# Patient Record
Sex: MALE | Race: WHITE | Employment: FULL TIME | URBAN - NONMETROPOLITAN AREA
[De-identification: names, ages, dates, MRNs, and addresses within clinical notes are randomized per-mention and may not be internally consistent; named-entity substitution may affect disease eponyms.]

---

## 2021-06-13 PROCEDURE — 99282 EMERGENCY DEPT VISIT SF MDM: CPT

## 2021-06-13 PROCEDURE — 12011 RPR F/E/E/N/L/M 2.5 CM/<: CPT

## 2021-06-14 ENCOUNTER — HOSPITAL ENCOUNTER (EMERGENCY)
Age: 21
Discharge: HOME | End: 2021-06-14

## 2021-06-14 VITALS
TEMPERATURE: 97.8 F | OXYGEN SATURATION: 99 % | HEART RATE: 60 BPM | SYSTOLIC BLOOD PRESSURE: 145 MMHG | DIASTOLIC BLOOD PRESSURE: 80 MMHG | RESPIRATION RATE: 18 BRPM

## 2021-06-14 DIAGNOSIS — S01.81XA FACIAL LACERATION, INITIAL ENCOUNTER: Primary | ICD-10-CM

## 2021-06-14 NOTE — ED TRIAGE NOTES
Pt to er.pt states he was running and hit side of his head on a wall. Pt has small lac noted to rt side of rt eye. Bleeding controlled. Pt denies any LOC. Denies pain. Pt a & o x 4.

## 2021-06-14 NOTE — ED PROVIDER NOTES
Cervical back: Normal range of motion. Skin:     General: Skin is warm and dry. Comments: 1 cm laceration noted 2 cm lateral to the right eye. Approximately 2 to 3 mm dehiscence of the wound without any active bleeding. Extends just to the dermis without subcutaneous involvement. No bony, tendon, nerve involvement. Neurological:      General: No focal deficit present. Mental Status: He is alert and oriented to person, place, and time. Psychiatric:         Mood and Affect: Mood normal.         DIFFERENTIAL DIAGNOSIS:   Differential diagnoses are discussed    DIAGNOSTIC RESULTS     EKG: All EKG's are interpreted by the Emergency Department Physician who either signs or Co-signsthis chart in the absence of a cardiologist.        RADIOLOGY: non-plain film images(s) such as CT, Ultrasound and MRI are read by the radiologist.    No orders to display       LABS:    Labs Reviewed - No data to display    EMERGENCY DEPARTMENT COURSE:   Vitals:    Vitals:    06/14/21 0004   BP: (!) 145/80   Pulse: 60   Resp: 18   Temp: 97.8 °F (36.6 °C)   TempSrc: Oral   SpO2: 99%      6:40 AM EDT: The patient was seen and evaluated. Patient presents for laceration to the right face. He arrives mildly hypertensive with otherwise reassuring vital signs. We did discuss different methods of repair including sutures, Dermabond, Dermabond and Steri-Strips. Patient prefers Dermabond and Steri-Strips. No indication for CT of the head at this time as he denies any other associated complaints. Wound was repaired and patient tolerated this well. Signs and symptoms of wound infection as well as proper wound care discussed. He feels comfortable with discharge at this time and denied further needs. CRITICAL CARE:   None    CONSULTS:  None    PROCEDURES:  Laceration Repair Procedure Note    Indication: Laceration    Procedure:  The patient was placed in the appropriate position and anesthesia around the laceration was not performed at the patient's request. The area was then cleansed with Shur-Clens and draped in a sterile fashion. The laceration was closed with Dermabond and steri strips. There were no additional lacerations requiring repair. Total repaired wound length: 1 cm. Other Items: None    The patient tolerated the procedure well. Complications: None      FINAL IMPRESSION      1. Facial laceration, initial encounter          DISPOSITION/PLAN   Discharge    PATIENT REFERRED TO:  [facility] EMERGENCY DEPT  [address]  [phone]    As needed      DISCHARGEMEDICATIONS:  There are no discharge medications for this patient.       (Please note that portions of this note were completedwith a voice recognition program.  Efforts were made to edit the dictations but occasionally words are mis-transcribed.)        Tim Cobb PA-C  06/14/21 [id number]

## 2021-08-23 DIAGNOSIS — M79.671 RIGHT FOOT PAIN: Primary | ICD-10-CM

## 2021-08-30 ENCOUNTER — HOSPITAL ENCOUNTER (OUTPATIENT)
Age: 21
Discharge: HOME | End: 2021-08-30
Payer: COMMERCIAL

## 2021-08-30 ENCOUNTER — HOSPITAL ENCOUNTER (OUTPATIENT)
Dept: GENERAL RADIOLOGY | Age: 21
Discharge: HOME | End: 2021-08-30
Payer: COMMERCIAL

## 2021-08-30 DIAGNOSIS — M79.671 RIGHT FOOT PAIN: ICD-10-CM

## 2021-08-30 PROCEDURE — 73630 X-RAY EXAM OF FOOT: CPT

## 2023-10-09 DIAGNOSIS — M25.561 ACUTE PAIN OF RIGHT KNEE: Primary | ICD-10-CM

## 2023-10-10 ENCOUNTER — HOSPITAL ENCOUNTER (OUTPATIENT)
Dept: GENERAL RADIOLOGY | Age: 23
Discharge: HOME | End: 2023-10-10
Payer: COMMERCIAL

## 2023-10-10 ENCOUNTER — HOSPITAL ENCOUNTER (OUTPATIENT)
Age: 23
Discharge: HOME | End: 2023-10-10
Payer: COMMERCIAL

## 2023-10-10 DIAGNOSIS — M25.561 ACUTE PAIN OF RIGHT KNEE: ICD-10-CM

## 2023-10-10 PROCEDURE — 73564 X-RAY EXAM KNEE 4 OR MORE: CPT
